# Patient Record
Sex: MALE | Race: WHITE
[De-identification: names, ages, dates, MRNs, and addresses within clinical notes are randomized per-mention and may not be internally consistent; named-entity substitution may affect disease eponyms.]

---

## 2018-05-08 ENCOUNTER — HOSPITAL ENCOUNTER (OUTPATIENT)
Dept: HOSPITAL 68 - STS | Age: 68
End: 2018-05-08
Payer: COMMERCIAL

## 2018-05-08 VITALS — HEIGHT: 69 IN | BODY MASS INDEX: 29.62 KG/M2 | WEIGHT: 200 LBS

## 2018-05-08 DIAGNOSIS — M19.90: ICD-10-CM

## 2018-05-08 DIAGNOSIS — H25.9: Primary | ICD-10-CM

## 2018-05-08 PROCEDURE — V2632 POST CHMBR INTRAOCULAR LENS: HCPCS

## 2018-05-08 NOTE — OPERATIVE REPORT
Operative/Inv Procedure Report
Surgery Date: 05/08/18
Name of Procedure:
Cataract extraction with intraocular lens implantation right eye
Pre-Operative Diagnosis:
Age-related cataract right eye
Post-Operative Diagnosis:
Same
Estimated Blood Loss: none
Surgeon/Assistant:
Troy Angelo MD
 
Anesthesia: local monitored anesthesi
Complications:
None
 
Operative/Procedure Note
Note:
Preoperatively the patient was noted to have 20/40 vision in the right eye with 
a decrease with glare testing down to 20/60. 
 
The risks, benefits, and alternatives to surgery were discussed at length with 
the patient.  Informed consent was obtained.  Orientation marks were placed on 
the eye in the preoperative area.  The patient was brought to the operating room
where the right eye was prepped and draped in the normal sterile fashion. 
 
 A speculum was placed on the right eye with good exposure.  The axis was marked
at 5. A stab incision was made using a paracentesis blade.  Intracameral 
lidocaine was placed.  Viscoelastic was used to form the anterior chamber.  A 
clear corneal incision was made using keratome blade.  A continuous curvilinear 
capsulorrhexis was made using a cystotome needle followed by Utrata forceps.  
There was no extension of the rhexis.  Hydrodissection was performed using 
balanced salt solution.  The cataract was removed using a stop and chop 
technique.  Residual cortex was removed using coaxial irrigation and aspiration.
 The capsule was polished using irrigation and aspiration and the posterior 
capsule was cleaned using a balanced salt solution jet.  There was no residual 
lens material inside the eye.  The capsular bag was reformed using viscoelastic.
 
 
An intraocular lens  of power 19.5 was verified and confirmed.  He was 
loaded into an injector and injected into the eye.  The lens was placed entirely
within the capsular bag.  Viscoelastic was evacuated using irrigation and 
aspiration.  The intraocular lens was rotated and placed in its proper axis 
orientation.  The wounds were stromally hydrated and the eye filled to 
physiologic pressure using balanced salt solution.  Intracameral cefuroxime was 
placed.  Speculum was removed and a shield was placed on the eye.  The patient 
was brought to the recovery area without incident.  Instructions were given to 
follow-up the next day for routine postoperative care.